# Patient Record
Sex: FEMALE | Race: WHITE | NOT HISPANIC OR LATINO | Employment: FULL TIME | URBAN - METROPOLITAN AREA
[De-identification: names, ages, dates, MRNs, and addresses within clinical notes are randomized per-mention and may not be internally consistent; named-entity substitution may affect disease eponyms.]

---

## 2019-04-17 ENCOUNTER — WALK IN (OUTPATIENT)
Dept: URGENT CARE | Age: 39
End: 2019-04-17

## 2019-04-17 VITALS
HEIGHT: 65 IN | TEMPERATURE: 99.2 F | DIASTOLIC BLOOD PRESSURE: 68 MMHG | RESPIRATION RATE: 12 BRPM | WEIGHT: 120 LBS | BODY MASS INDEX: 19.99 KG/M2 | SYSTOLIC BLOOD PRESSURE: 102 MMHG | HEART RATE: 90 BPM | OXYGEN SATURATION: 98 %

## 2019-04-17 DIAGNOSIS — J02.0 STREPTOCOCCAL PHARYNGITIS: Primary | ICD-10-CM

## 2019-04-17 LAB
INTERNAL PROCEDURAL CONTROLS ACCEPTABLE: YES
S PYO AG THROAT QL IA.RAPID: POSITIVE

## 2019-04-17 PROCEDURE — 99203 OFFICE O/P NEW LOW 30 MIN: CPT | Performed by: NURSE PRACTITIONER

## 2019-04-17 PROCEDURE — 87880 STREP A ASSAY W/OPTIC: CPT | Performed by: NURSE PRACTITIONER

## 2019-04-17 RX ORDER — AMOXICILLIN 500 MG/1
500 CAPSULE ORAL 2 TIMES DAILY
Qty: 20 CAPSULE | Refills: 0 | Status: SHIPPED | OUTPATIENT
Start: 2019-04-17 | End: 2019-04-27

## 2019-04-17 ASSESSMENT — ENCOUNTER SYMPTOMS
SWOLLEN GLANDS: 1
SINUS PRESSURE: 0
SORE THROAT: 1
CHILLS: 1
COUGH: 0
SINUS PAIN: 0

## 2022-08-30 ENCOUNTER — APPOINTMENT (OUTPATIENT)
Dept: URBAN - METROPOLITAN AREA CLINIC 245 | Age: 42
Setting detail: DERMATOLOGY
End: 2022-08-30

## 2022-08-30 DIAGNOSIS — D18.0 HEMANGIOMA: ICD-10-CM

## 2022-08-30 DIAGNOSIS — L82.1 OTHER SEBORRHEIC KERATOSIS: ICD-10-CM

## 2022-08-30 DIAGNOSIS — L82.0 INFLAMED SEBORRHEIC KERATOSIS: ICD-10-CM

## 2022-08-30 DIAGNOSIS — L30.1 DYSHIDROSIS [POMPHOLYX]: ICD-10-CM

## 2022-08-30 DIAGNOSIS — L57.8 OTHER SKIN CHANGES DUE TO CHRONIC EXPOSURE TO NONIONIZING RADIATION: ICD-10-CM

## 2022-08-30 PROBLEM — D18.01 HEMANGIOMA OF SKIN AND SUBCUTANEOUS TISSUE: Status: ACTIVE | Noted: 2022-08-30

## 2022-08-30 PROCEDURE — OTHER LIQUID NITROGEN: OTHER

## 2022-08-30 PROCEDURE — 99203 OFFICE O/P NEW LOW 30 MIN: CPT | Mod: 25

## 2022-08-30 PROCEDURE — OTHER MIPS QUALITY: OTHER

## 2022-08-30 PROCEDURE — OTHER PRESCRIPTION: OTHER

## 2022-08-30 PROCEDURE — 17110 DESTRUCT B9 LESION 1-14: CPT

## 2022-08-30 PROCEDURE — OTHER COUNSELING: OTHER

## 2022-08-30 RX ORDER — CLOBETASOL PROPIONATE 0.5 MG/G
CREAM TOPICAL BID
Qty: 30 | Refills: 1 | Status: ERX | COMMUNITY
Start: 2022-08-30

## 2022-08-30 ASSESSMENT — LOCATION DETAILED DESCRIPTION DERM
LOCATION DETAILED: LEFT SUPERIOR LATERAL UPPER BACK
LOCATION DETAILED: RIGHT DISTAL DORSAL SMALL FINGER
LOCATION DETAILED: EPIGASTRIC SKIN
LOCATION DETAILED: RIGHT DISTAL DORSAL RING FINGER
LOCATION DETAILED: LEFT DISTAL DORSAL INDEX FINGER
LOCATION DETAILED: LEFT DISTAL DORSAL RING FINGER
LOCATION DETAILED: INFERIOR THORACIC SPINE
LOCATION DETAILED: RIGHT DISTAL DORSAL INDEX FINGER
LOCATION DETAILED: LEFT DISTAL DORSAL MIDDLE FINGER
LOCATION DETAILED: LEFT CLAVICULAR SKIN
LOCATION DETAILED: RIGHT DISTAL DORSAL MIDDLE FINGER
LOCATION DETAILED: INFERIOR MID FOREHEAD
LOCATION DETAILED: LEFT DISTAL DORSAL SMALL FINGER

## 2022-08-30 ASSESSMENT — LOCATION SIMPLE DESCRIPTION DERM
LOCATION SIMPLE: UPPER BACK
LOCATION SIMPLE: RIGHT INDEX FINGER
LOCATION SIMPLE: LEFT RING FINGER
LOCATION SIMPLE: RIGHT RING FINGER
LOCATION SIMPLE: LEFT SMALL FINGER
LOCATION SIMPLE: LEFT INDEX FINGER
LOCATION SIMPLE: RIGHT SMALL FINGER
LOCATION SIMPLE: ABDOMEN
LOCATION SIMPLE: LEFT CLAVICULAR SKIN
LOCATION SIMPLE: INFERIOR FOREHEAD
LOCATION SIMPLE: LEFT UPPER BACK
LOCATION SIMPLE: RIGHT MIDDLE FINGER
LOCATION SIMPLE: LEFT MIDDLE FINGER

## 2022-08-30 ASSESSMENT — LOCATION ZONE DERM
LOCATION ZONE: TRUNK
LOCATION ZONE: FINGER
LOCATION ZONE: FACE

## 2022-08-30 NOTE — PROCEDURE: LIQUID NITROGEN
Show Aperture Variable?: No
Medical Necessity Clause: This procedure was medically necessary because the lesions that were treated were:
Show Applicator Variable?: Yes
Consent: The patient's consent was obtained including but not limited to risks of crusting, scabbing, blistering, scarring, darker or lighter pigmentary change, recurrence, incomplete removal and infection.
Medical Necessity Information: It is in your best interest to select a reason for this procedure from the list below. All of these items fulfill various CMS LCD requirements except the new and changing color options.
Detail Level: Detailed
Post-Care Instructions: I reviewed with the patient in detail post-care instructions. Patient is to wear sunprotection, and avoid picking at any of the treated lesions. Pt may apply Vaseline to crusted or scabbing areas.
Number Of Freeze-Thaw Cycles: 1 freeze-thaw cycle
Application Tool (Optional): Cry-AC
Duration Of Freeze Thaw-Cycle (Seconds): 3
Spray Paint Text: The liquid nitrogen was applied to the skin utilizing a spray paint frosting technique.

## 2022-12-06 ENCOUNTER — IMAGING SERVICES (OUTPATIENT)
Dept: MAMMOGRAPHY | Age: 42
End: 2022-12-06
Attending: OBSTETRICS & GYNECOLOGY

## 2022-12-06 DIAGNOSIS — Z12.31 SCREENING MAMMOGRAM FOR BREAST CANCER: ICD-10-CM

## 2022-12-06 PROCEDURE — 77067 SCR MAMMO BI INCL CAD: CPT | Performed by: RADIOLOGY

## 2022-12-06 PROCEDURE — 77063 BREAST TOMOSYNTHESIS BI: CPT | Performed by: RADIOLOGY
